# Patient Record
(demographics unavailable — no encounter records)

---

## 2025-02-14 NOTE — HISTORY OF PRESENT ILLNESS
[FreeTextEntry1] : 57-year-old  female here for evaluation of pelvic organ prolapse. Prolapse has progressed over the last 10 years. Sometimes has discomfort when ambulating. Urinates frequently and does not feel like she empties her bladder completely. Decreased sexual activity due to prolapse. Denies incontinence, straining or splinting. Vaginal bleeding at times. Last 1 month ago. She had a transvaginal ultrasound with GYN Dr. Leon at [facility]. Found to have 1.2cm endometrial polyp. D&C recommended.   PVR-98ml   LMP- PMH-Asthma PSH-cholecystectomy 21 years ago FamHx-denies  malignancies SocHx-denies smoking, drugs or alcohol use Meds/Vitamins-denies Allergies-denies

## 2025-02-14 NOTE — PHYSICAL EXAM
[General Appearance - Well Developed] : well developed [General Appearance - Well Nourished] : well nourished [Normal Appearance] : normal appearance [Well Groomed] : well groomed [General Appearance - In No Acute Distress] : no acute distress [] : no respiratory distress [Exaggerated Use Of Accessory Muscles For Inspiration] : no accessory muscle use [Abdomen Soft] : soft [Abdomen Tenderness] : non-tender [External Female Genitalia] : normal external genitalia [Normal Station and Gait] : the gait and station were normal for the patient's age [Skin Color & Pigmentation] : normal skin color and pigmentation [No Focal Deficits] : no focal deficits [Oriented To Time, Place, And Person] : oriented to person, place, and time [Affect] : the affect was normal [Mood] : the mood was normal [Not Anxious] : not anxious [Chaperone Present] : A chaperone was present in the examining room during all aspects of the physical examination [[id number]] : A chaperone was present during the pelvic exam. [de-identified] : Total procidentia, Negative CST [FreeTextEntry2] : Nia Lo MA

## 2025-02-14 NOTE — ASSESSMENT
[FreeTextEntry1] :   Impression/plan: 57-year-old female with pelvic organ prolapse-total procidentia.   -Options for management of POP discussed. Reviewed conservative management with PFE or a pessary. Surgical plan would include repair with WALLACE. RBAPC discussed. All questions answered. Patient is leaning toward surgical management.  -Informed patient that she would need to have uterine polyp addressed prior to surgery. Recommend evaluation by Dr. Banerjee. Contact information provided.  -UDS required prior to surgical intervention. Will schedule appointment.  -Urine culture today. Will f/u with result.   I, Dr. Dougie Hoffman, personally performed the evaluation and management (E/M) services for this new patient.  That E/M includes conducting the clinically appropriate initial history &/or exam, assessing all conditions, and establishing the plan of care.  Today, my TAMIKA Mario Alberto, was here to observe &/or participate in the visit & follow plan of care established by me.

## 2025-02-14 NOTE — PHYSICAL EXAM
[General Appearance - Well Developed] : well developed [General Appearance - Well Nourished] : well nourished [Normal Appearance] : normal appearance [Well Groomed] : well groomed [General Appearance - In No Acute Distress] : no acute distress [] : no respiratory distress [Exaggerated Use Of Accessory Muscles For Inspiration] : no accessory muscle use [Abdomen Soft] : soft [Abdomen Tenderness] : non-tender [External Female Genitalia] : normal external genitalia [Normal Station and Gait] : the gait and station were normal for the patient's age [Skin Color & Pigmentation] : normal skin color and pigmentation [No Focal Deficits] : no focal deficits [Oriented To Time, Place, And Person] : oriented to person, place, and time [Affect] : the affect was normal [Mood] : the mood was normal [Not Anxious] : not anxious [Chaperone Present] : A chaperone was present in the examining room during all aspects of the physical examination [[id number]] : A chaperone was present during the pelvic exam. [de-identified] : Total procidentia, Negative CST [FreeTextEntry2] : Nia Lo MA

## 2025-02-14 NOTE — HISTORY OF PRESENT ILLNESS
[FreeTextEntry1] : 57-year-old  female here for evaluation of pelvic organ prolapse. Prolapse has progressed over the last 10 years. Sometimes has discomfort when ambulating. Urinates frequently and does not feel like she empties her bladder completely. Decreased sexual activity due to prolapse. Denies incontinence, straining or splinting. Vaginal bleeding at times. Last 1 month ago. She had a transvaginal ultrasound with GYN Dr. Leon at [facility]. Found to have 1.2cm endometrial polyp. D&C recommended.   PVR-98ml   LMP- PMH-Asthma PSH-cholecystectomy 21 years ago FamHx-denies  malignancies SocHx-denies smoking, drugs or alcohol use Meds/Vitamins-denies Allergies-denies

## 2025-02-14 NOTE — HISTORY OF PRESENT ILLNESS
[FreeTextEntry1] : 57-year-old  female here for evaluation of pelvic organ prolapse. Prolapse has progressed over the last 10 years. Sometimes has discomfort when ambulating. Urinates frequently and does not feel like she empties her bladder completely. Decreased sexual activity due to prolapse. Denies incontinence, straining or splinting. Vaginal bleeding at times. Last 1 month ago. She had a transvaginal ultrasound with GYN Dr. Leon at [facility]. Found to have 1.2cm endometrial polyp. D&C recommended.   PVR-98ml   LMP- PMH-Asthma PSH-cholecystectomy 21 years ago FamHx-denies  malignancies SocHx-denies smoking, drugs or alcohol use Meds/Vitamins-denies Allergies-denies

## 2025-02-26 NOTE — PLAN
[FreeTextEntry1] : 57-year-old  female here for surgical consultation for endometrial polyp, 1/2cm on TVUS at [facility] with Dr. Leon. - Plan for hysteroscopy polypectomy. Patient informed that we will need to get a pathology result prior to her prolapse surgery so that we can rule out malignancy. Surgical guadarrama tasked.  - For prolapse management, offered patient pessary vs LS WALLACE with sacrocolpopexy as combination surgery with Alia Banerjee and Yasmin. Patient elected for surgery. Surgical guadarrama tasked.   Discussed with Dr. Banerjee.  Gayle Galvez, PGY3

## 2025-02-26 NOTE — PLAN
[FreeTextEntry1] : 57-year-old  female here for surgical consultation for endometrial polyp, 1/2cm on TVUS at [facility] with Dr. Leon. - Plan for hysteroscopy polypectomy. Patient informed that we will need to get a pathology result prior to her prolapse surgery so that we can rule out malignancy. Surgical guadarrama tasked.  - For prolapse management, offered patient pessary vs LS WALLACE with sacrocolpopexy as combination surgery with Alia Banerjee and Yasmin. Patient elected for surgery. Surgical guadarrama tasked.   Discussed with Dr. Banerjee.  Gayle Galvez, PGY3

## 2025-02-26 NOTE — PLAN
[FreeTextEntry1] : 57-year-old  female here for surgical consultation for endometrial polyp, 1/2cm on TVUS at [facility] with Dr. Leon. - Plan for hysteroscopy polypectomy. Patient informed that we will need to get a pathology result prior to her prolapse surgery so that we can rule out malignancy. Surgical guadarrama tasked.  - For prolapse management, offered patient pessary vs LS WALLACE with sacrocolpopexy as combination surgery with Alia Banerjee and Yasmin. Patient elected for surgery. Surgical guadarrama tasked.   Discussed with Dr. Banerjee.  Gayle Galvez, PGY3

## 2025-02-28 NOTE — DISCUSSION/SUMMARY
[FreeTextEntry1] : 57-year-old  female here for surgical consultation for endometrial polyp, 1/2cm on TVUS at [facility] with Dr. Leon. - Plan for hysteroscopy polypectomy. Patient informed that we will need to get a pathology result prior to her prolapse surgery so that we can rule out malignancy. Surgical guadarrama tasked.  - For prolapse management, offered patient pessary vs LS WALLACE with sacrocolpopexy as combination surgery with Alia Banerjee and Yasmin. Patient elected for surgery. Surgical guadarrama tasked.   Discussed with Dr. Banerjee.  Gayle Galvez, PGY3

## 2025-02-28 NOTE — HISTORY OF PRESENT ILLNESS
[Patient reported PAP Smear was normal] : Patient reported PAP Smear was normal [postmenopausal] : postmenopausal [N] : Patient is not sexually active [Y] : Positive pregnancy history [Currently In Menopause] : currently in menopause [Menopause Age: ____] : age at menopause was [unfilled] [Previously active] : previously active [FreeTextEntry1] : 57-year-old  female here for surgical consultation for endometrial polyp, 1/2cm on TVUS at [facility] with Dr. Leon. She had postmenopausal bleeding over the summer, which has since resolved. She additionally has complete procidentia and is desiring surgical management with Alia Banerjee and Yasmin.   LMP-2020 OBHx-  x 8, MAB D&C x 1, SAB x 3 PMH-Asthma PSH-L/S cholecystectomy 21 years ago FamHx-denies  malignancies SocHx-denies smoking, drugs or alcohol use Meds/Vitamins-denies Allergies-denies [PapSmeardate] : 2025 [PGxTotal] : 12 [[facility] [PGHxABSpont] : 4

## 2025-02-28 NOTE — DISCUSSION/SUMMARY
[FreeTextEntry1] : 57-year-old  female here for surgical consultation for endometrial polyp, 1/2cm on TVUS at [facility] with Dr. Leon. - Plan for hysteroscopy polypectomy. Patient informed that we will need to get a pathology result prior to her prolapse surgery so that we can rule out malignancy. Surgical guadarrama tasked.  - For prolapse management, offered patient pessary vs LS WALLACE with sacrocolpopexy as combination surgery with Alia Banerjee and Yasmin. Patient elected for surgery. Surgical guadarrama tasked.   Discussed with Dr. Banerjee.  Gayle Galvez, PGY3

## 2025-02-28 NOTE — END OF VISIT
[] : Resident [FreeTextEntry3] : I explained to the patient that the most long term data on prolapse surgery is for sacro-colpopexy which can be done robotically, there can be preservation of the uterus but data is limited. First she needs to have polyp removed and tested.  The options for surgical approach including open, vaginal, and laparoscopic with or without robotic assistance were discussed and the patient agrees with plan for hysteroscopy and polypectomy followed by laparoscopic supracervical hysterectomy with bilateral salpingectomy.  The differential diagnosis was discussed in detail. The indications, risks, benefits and alternatives were discussed. Including but not limited to conversion to laparotomy, bleeding, infection, injury to surrounding organs was discussed at length.  She understand that there is increased risk for bladder injury with prior  section. Chance of occult injury and need for future surgery. PRAKASH FENTON expressed an understanding of the treatment rationale and her questions were answered to her apparent satisfaction.  She was given written information about postoperative care and diagrams of the pelvic anatomy.

## 2025-02-28 NOTE — PHYSICAL EXAM
[Appropriately responsive] : appropriately responsive [Alert] : alert [No Acute Distress] : no acute distress [Oriented x3] : oriented x3 [FreeTextEntry5] : no increased WOB

## 2025-02-28 NOTE — DISCUSSION/SUMMARY
[FreeTextEntry1] : 57-year-old  female here for surgical consultation for endometrial polyp, 1/2cm on TVUS at [facility] with Dr. Leon. - Plan for hysteroscopy polypectomy. Patient informed that we will need to get a pathology result prior to her prolapse surgery so that we can rule out malignancy. Surgical guadarrama tasked.  - For prolapse management, offered patient pessary vs LS WALLACE with sacrocolpopexy as combination surgery with Alia Banerjee and Yasmin. Patient elected for surgery. Surgical guadarrama tasked.   Discussed with Dr. Banerjee.  Gayle Galvez, PGY3

## 2025-02-28 NOTE — HISTORY OF PRESENT ILLNESS
[Patient reported PAP Smear was normal] : Patient reported PAP Smear was normal [postmenopausal] : postmenopausal [N] : Patient is not sexually active [Y] : Positive pregnancy history [Currently In Menopause] : currently in menopause [Menopause Age: ____] : age at menopause was [unfilled] [Previously active] : previously active [FreeTextEntry1] : 57-year-old  female here for surgical consultation for endometrial polyp, 1/2cm on TVUS at [facility] with Dr. Leon. She had postmenopausal bleeding over the summer, which has since resolved. She additionally has complete procidentia and is desiring surgical management with Alia Banerjee and Yasmin.   LMP-2020 OBHx-  x 8, MAB D&C x 1, SAB x 3 PMH-Asthma PSH-L/S cholecystectomy 21 years ago FamHx-denies  malignancies SocHx-denies smoking, drugs or alcohol use Meds/Vitamins-denies Allergies-denies [PapSmeardate] : 2025 [PGxTotal] : 12 [[facility] 8 [PGHxABSpont] : 4

## 2025-02-28 NOTE — HISTORY OF PRESENT ILLNESS
[Patient reported PAP Smear was normal] : Patient reported PAP Smear was normal [postmenopausal] : postmenopausal [N] : Patient is not sexually active [Y] : Positive pregnancy history [Currently In Menopause] : currently in menopause [Menopause Age: ____] : age at menopause was [unfilled] [Previously active] : previously active [FreeTextEntry1] : 57-year-old  female here for surgical consultation for endometrial polyp, 1/2cm on TVUS at [facility] with Dr. Leon. She had postmenopausal bleeding over the summer, which has since resolved. She additionally has complete procidentia and is desiring surgical management with Alia Banerjee and Yasmin.   LMP-2020 OBHx-  x 8, MAB D&C x 1, SAB x 3 PMH-Asthma PSH-L/S cholecystectomy 21 years ago FamHx-denies  malignancies SocHx-denies smoking, drugs or alcohol use Meds/Vitamins-denies Allergies-denies [PapSmeardate] : 2025 [PGxTotal] : 12 [[facility] [PGHxABSpont] : 4